# Patient Record
Sex: MALE | Race: WHITE | URBAN - METROPOLITAN AREA
[De-identification: names, ages, dates, MRNs, and addresses within clinical notes are randomized per-mention and may not be internally consistent; named-entity substitution may affect disease eponyms.]

---

## 2019-06-27 ENCOUNTER — Encounter (OUTPATIENT)
Dept: URBAN - METROPOLITAN AREA CLINIC 71 | Facility: CLINIC | Age: 84
End: 2019-06-27
Payer: COMMERCIAL

## 2019-06-27 PROCEDURE — 99202 OFFICE O/P NEW SF 15 MIN: CPT | Performed by: OPHTHALMOLOGY

## 2019-07-01 ENCOUNTER — Encounter (OUTPATIENT)
Dept: URBAN - METROPOLITAN AREA CLINIC 71 | Facility: CLINIC | Age: 84
End: 2019-07-01
Payer: COMMERCIAL

## 2019-07-01 PROCEDURE — 64612 DESTROY NERVE FACE MUSCLE: CPT | Performed by: OPHTHALMOLOGY

## 2020-01-23 ENCOUNTER — Encounter (OUTPATIENT)
Dept: URBAN - METROPOLITAN AREA CLINIC 71 | Facility: CLINIC | Age: 85
End: 2020-01-23
Payer: MEDICARE

## 2020-01-23 PROCEDURE — 64612 DESTROY NERVE FACE MUSCLE: CPT | Performed by: OPHTHALMOLOGY

## 2020-07-22 ENCOUNTER — OFFICE VISIT (OUTPATIENT)
Dept: URBAN - METROPOLITAN AREA CLINIC 72 | Facility: CLINIC | Age: 85
End: 2020-07-22
Payer: MEDICARE

## 2020-07-22 DIAGNOSIS — H52.4 PRESBYOPIA: ICD-10-CM

## 2020-07-22 PROCEDURE — 92014 COMPRE OPH EXAM EST PT 1/>: CPT | Performed by: OPTOMETRIST

## 2020-07-22 PROCEDURE — 92134 CPTRZ OPH DX IMG PST SGM RTA: CPT | Performed by: OPTOMETRIST

## 2020-07-22 ASSESSMENT — INTRAOCULAR PRESSURE
OD: 10
OS: 11

## 2020-07-22 ASSESSMENT — VISUAL ACUITY
OS: 20/30
OD: 20/40

## 2020-07-22 NOTE — IMPRESSION/PLAN
Impression: Nexdtve age-related mclr degn, bilateral, intermed dry stage: H35.3132. Plan: The clinical exam and OCT are stable without evidence of progression. The patient was advised to continue [employer] multivitamins. The patient was also advised to continue to monitor the [place] grid, avoid smoking, use sunglasses whenever outside, and green leafy vegetables  and call us immediately with any visual changes.

## 2020-10-01 ENCOUNTER — OFFICE VISIT (OUTPATIENT)
Dept: URBAN - METROPOLITAN AREA CLINIC 71 | Facility: CLINIC | Age: 85
End: 2020-10-01
Payer: MEDICARE

## 2020-10-01 PROCEDURE — 64612 DESTROY NERVE FACE MUSCLE: CPT | Performed by: OPHTHALMOLOGY

## 2020-10-01 ASSESSMENT — INTRAOCULAR PRESSURE
OS: 11
OD: 12

## 2020-10-28 ENCOUNTER — OFFICE VISIT (OUTPATIENT)
Dept: URBAN - METROPOLITAN AREA CLINIC 71 | Facility: CLINIC | Age: 85
End: 2020-10-28
Payer: MEDICARE

## 2020-10-28 DIAGNOSIS — G24.5 BLEPHAROSPASM: Primary | ICD-10-CM

## 2020-10-28 DIAGNOSIS — Z96.1 PRESENCE OF INTRAOCULAR LENS: ICD-10-CM

## 2020-10-28 DIAGNOSIS — H35.3132 NEXDTVE AGE-RELATED MCLR DEGN, BILATERAL, INTERMED DRY STAGE: ICD-10-CM

## 2020-10-28 PROCEDURE — 99213 OFFICE O/P EST LOW 20 MIN: CPT | Performed by: OPHTHALMOLOGY

## 2020-10-28 PROCEDURE — 92134 CPTRZ OPH DX IMG PST SGM RTA: CPT | Performed by: OPHTHALMOLOGY

## 2020-10-28 ASSESSMENT — INTRAOCULAR PRESSURE
OD: 10
OS: 7

## 2020-10-28 NOTE — IMPRESSION/PLAN
Impression: Presence of intraocular lens: Z96.1. Bilateral
Pt states he feels his vision is better without his glasses than with them.  Plan: Updated glasses Rx given

## 2020-10-28 NOTE — IMPRESSION/PLAN
Impression: Blepharospasm: G24.5. Treated with Xeomin. No ptosis or blepharospasms today. Plan: Will continue to observe condition and or symptoms.  Contact office with any recurring symptoms

## 2020-12-16 ENCOUNTER — OFFICE VISIT (OUTPATIENT)
Dept: URBAN - METROPOLITAN AREA CLINIC 72 | Facility: CLINIC | Age: 85
End: 2020-12-16
Payer: MEDICARE

## 2020-12-16 DIAGNOSIS — H26.493 OTHER SECONDARY CATARACT, BILATERAL: ICD-10-CM

## 2020-12-16 DIAGNOSIS — H43.813 VITREOUS DEGENERATION, BILATERAL: ICD-10-CM

## 2020-12-16 PROCEDURE — 99214 OFFICE O/P EST MOD 30 MIN: CPT | Performed by: OPTOMETRIST

## 2020-12-16 PROCEDURE — 92134 CPTRZ OPH DX IMG PST SGM RTA: CPT | Performed by: OPTOMETRIST

## 2020-12-16 ASSESSMENT — INTRAOCULAR PRESSURE
OS: 6
OD: 8

## 2020-12-16 NOTE — IMPRESSION/PLAN
Impression: Other secondary cataract, bilateral: H26.493. Bilateral. Plan: Discussed diagnosis, explained and understood. Monitor.

## 2020-12-16 NOTE — IMPRESSION/PLAN
Impression: Nexdtve age-related mclr degn, bilateral, intermed dry stage: H35.3132. established, stable Plan: The clinical exam and OCT are stable without evidence of progression. The patient was advised to continue [employer] multivitamins. The patient was also advised to continue to monitor the [place] grid, avoid smoking, use sunglasses whenever outside, and green leafy vegetables  and call us immediately with any visual changes.

## 2020-12-16 NOTE — IMPRESSION/PLAN
Impression: Vitreous degeneration, bilateral: H43.813. Plan: Discussed diagnosis, explained and understood. Monitor.